# Patient Record
Sex: MALE | Race: ASIAN | Employment: FULL TIME | URBAN - METROPOLITAN AREA
[De-identification: names, ages, dates, MRNs, and addresses within clinical notes are randomized per-mention and may not be internally consistent; named-entity substitution may affect disease eponyms.]

---

## 2017-01-06 ENCOUNTER — OFFICE VISIT (OUTPATIENT)
Dept: FAMILY MEDICINE CLINIC | Facility: CLINIC | Age: 57
End: 2017-01-06

## 2017-01-06 VITALS
SYSTOLIC BLOOD PRESSURE: 134 MMHG | RESPIRATION RATE: 16 BRPM | OXYGEN SATURATION: 99 % | TEMPERATURE: 98 F | HEART RATE: 68 BPM | DIASTOLIC BLOOD PRESSURE: 86 MMHG

## 2017-01-06 DIAGNOSIS — H10.13 ALLERGIC CONJUNCTIVITIS AND RHINITIS, BILATERAL: Primary | ICD-10-CM

## 2017-01-06 DIAGNOSIS — J00 ACUTE NASOPHARYNGITIS: ICD-10-CM

## 2017-01-06 DIAGNOSIS — J30.9 ALLERGIC CONJUNCTIVITIS AND RHINITIS, BILATERAL: Primary | ICD-10-CM

## 2017-01-06 PROCEDURE — 99202 OFFICE O/P NEW SF 15 MIN: CPT | Performed by: NURSE PRACTITIONER

## 2017-01-06 RX ORDER — LOVASTATIN 40 MG/1
TABLET ORAL
Refills: 0 | COMMUNITY
Start: 2016-12-16

## 2017-01-06 RX ORDER — OMEPRAZOLE 40 MG/1
CAPSULE, DELAYED RELEASE ORAL
Refills: 1 | COMMUNITY
Start: 2016-12-16

## 2017-01-06 RX ORDER — LEVOTHYROXINE SODIUM 175 UG/1
TABLET ORAL
Refills: 3 | COMMUNITY
Start: 2016-12-16

## 2017-01-06 RX ORDER — TAMSULOSIN HYDROCHLORIDE 0.4 MG/1
CAPSULE ORAL
Refills: 0 | COMMUNITY
Start: 2016-12-16

## 2017-01-06 RX ORDER — MONTELUKAST SODIUM 10 MG/1
TABLET ORAL
Refills: 2 | COMMUNITY
Start: 2016-12-16

## 2017-01-06 RX ORDER — RANITIDINE 300 MG/1
TABLET ORAL
Refills: 2 | COMMUNITY
Start: 2016-12-16

## 2017-01-06 NOTE — PROGRESS NOTES
CHIEF COMPLAINT:   Patient presents with:  Eye Problem  Cough    HPI:   Lisbeth York is a 55year old male who presents for bilateral eye redness with pruritis, \"runny nose\", sore throat and dry cough.    He woke up with his symptoms 3 days ago after wor GENERAL: well developed, well nourished, in no apparent distress, clean, well kept, answering questions appropriately, reliable historian  SKIN: no rashes, no suspicious lesions. Well perfused and hydrated with brick cap refill and turgor.  Color and textur Patient Instructions   Try Flonase or Nasocort, 1 spray each nostril daily for at least 1-2 weeks, may take 2-3 days to notice improvement  Try Allegra or Zyrtec to see if your symptoms improve  Follow-up with your doctor if no improvement or if symptoms w · Cuando el nivel de polen en el ambiente está alto, mantenga cerradas las ventanas de canchola casa y canchola automóvil. De ser posible, use en cambio el aire acondicionado. · Use rico mascarilla con filtro cuando kerri el césped o trabaje en Kaaren Alter.   Ácaros del © 9923-3605 The [facility], [facility] [place]. Todos los derechos reservados. Esta información no pretende sustituir la atención médica profesional. Sólo canchola médico puede diagnosticar y tratar un problema de barak. 7. No use lentes de contacto hasta que barbara ojos hayan sanado y todos los síntomas hayan desaparecido. Programe rico VISITA DE CONTROL con canchola médico o janet centro según le indiquen, o si no ha leona en los próximos dorene días.   Busque Prontamente Atenció

## 2017-01-06 NOTE — PATIENT INSTRUCTIONS
Try Flonase or Nasocort, 1 spray each nostril daily for at least 1-2 weeks, may take 2-3 days to notice improvement  Try Allegra or Zyrtec to see if your symptoms improve  Follow-up with your doctor if no improvement or if symptoms worsen or persist.     R · Cuando el nivel de polen en el ambiente está alto, mantenga cerradas las ventanas de canchola casa y canchola automóvil. De ser posible, use en cambio el aire acondicionado. · Use rico mascarilla con filtro cuando kerri el césped o trabaje en Luana Youssef.   Ácaros del © 4477-1757 The [address] [facility], [facility] [place]. Todos los derechos reservados. Esta información no pretende sustituir la atención médica profesional. Sólo canchola médico puede diagnosticar y tratar un problema de barak. 7. No use lentes de contacto hasta que barbara ojos hayan sanado y todos los síntomas hayan desaparecido. Programe rico VISITA DE CONTROL con canchola médico o janet centro según le indiquen, o si no ha leona en los próximos dorene días.   Busque Prontamente Atenció

## (undated) NOTE — MR AVS SNAPSHOT
[address] [facility] [address]  [place] [id number] [phone]               Thank you for choosing us for your health care visit with REY Gao.   We are glad to serve you and happy to provide you with this summary of your vis respiración y desencadenar rico afección llamada asma. Se pueden realizar pruebas para detectar a qué alérgenos reacciona usted. Es posible que le remitan a un especialista en alergias para que le evalúe y le yordy pruebas.   Cuidados en canchola [place] empeorar los síntomas. Visitas de control  Programe rico visita de control según le indique el proveedor de atención médica o [employer] personal. Si le remitieron a un especialista en alergias, coordine nena oskar sin demoras.   Cuándo debe buscar atención médi que los párpados tengan costras por la Zeinab. Emplee rico robbie humedecida con agua tibia para [address] [facility] [address] costras.  También puede irrigar los ojos con rico solución salina (saline solution) o emplear lágrimas artificiales (artificial tears) para limpiar la mu Commonly known as:  ZANTAC           tamsulosin HCl 0.4 MG Caps   Commonly known as:  FLOMAX                   Referral Information     Referral Order Referred to [address] Phone Visits Status Diagnosis                 MyChart     Sign up for lacie Alexander are inactive.      HOW TO GET STARTED: HOW TO STAY MOTIVATED:   Start activities slowly and build up over time Do what you like   Get your heart pumping – brisk walking, biking, swimming Even 10 minute increments are effective and add up over the week   2 ½